# Patient Record
Sex: MALE
[De-identification: names, ages, dates, MRNs, and addresses within clinical notes are randomized per-mention and may not be internally consistent; named-entity substitution may affect disease eponyms.]

---

## 2024-08-29 ENCOUNTER — APPOINTMENT (OUTPATIENT)
Age: 29
End: 2024-08-29
Payer: COMMERCIAL

## 2024-08-29 DIAGNOSIS — M67.431 GANGLION, RIGHT WRIST: ICD-10-CM

## 2024-08-29 PROBLEM — Z00.00 ENCOUNTER FOR PREVENTIVE HEALTH EXAMINATION: Status: ACTIVE | Noted: 2024-08-29

## 2024-08-29 PROCEDURE — 20612 ASPIRATE/INJ GANGLION CYST: CPT

## 2024-08-29 PROCEDURE — 99203 OFFICE O/P NEW LOW 30 MIN: CPT | Mod: 25

## 2024-08-29 PROCEDURE — 76942 ECHO GUIDE FOR BIOPSY: CPT

## 2024-08-30 PROBLEM — M67.431 GANGLION CYST OF VOLAR ASPECT OF RIGHT WRIST: Status: ACTIVE | Noted: 2024-08-30

## 2024-08-30 NOTE — PROCEDURE
[de-identified] : Ultrasound Guided Injection   Indication: Ensure Aspiration and injection into the gnaglion cyst utilizing the [employer] portable ultrasound machine, the Linear 10mm 19-4 MHz transducer, sterile ultrasound gel, ultrasound guidance with the probe in long axis to the DRUJ , utilizing an in-plane approach, was used for the following injection:    Injection: RIGHT ganglion cyst indication: Ganglion cyst.   A discussion was had with the patient regarding this procedure and all questions were answered. All risks, benefits and alternatives were discussed. These included but were not limited to bleeding, infection, and allergic reaction. A timeout was performed prior to the procedure to ensure proper side.  Chlorhexidine was used to sterilize the skin overlying the site of the cyst.  A 25-gauge needle was used to inject 2ccs 1% Lidocaine locally. An 18 gauge needle was used to aspirate <0.5cc of fluid. A sterile bandage was then applied. The patient tolerated the procedure well and there were no complications.

## 2024-08-30 NOTE — DISCUSSION/SUMMARY
[de-identified] : 29M with no significant PMH presenting for lump to volar radial wrist x2-3mo, consistent with ganglion cyst. S/p US-guided aspiration 08/29/2024.  - Follow up PRN for recurrent symptoms. Can consider repeat aspiration +/- CSI.

## 2024-08-30 NOTE — PROCEDURE
[de-identified] : Ultrasound Guided Injection   Indication: Ensure Aspiration and injection into the gnaglion cyst utilizing the [employer] portable ultrasound machine, the Linear 10mm 19-4 MHz transducer, sterile ultrasound gel, ultrasound guidance with the probe in long axis to the DRUJ , utilizing an in-plane approach, was used for the following injection:    Injection: RIGHT ganglion cyst indication: Ganglion cyst.   A discussion was had with the patient regarding this procedure and all questions were answered. All risks, benefits and alternatives were discussed. These included but were not limited to bleeding, infection, and allergic reaction. A timeout was performed prior to the procedure to ensure proper side.  Chlorhexidine was used to sterilize the skin overlying the site of the cyst.  A 25-gauge needle was used to inject 2ccs 1% Lidocaine locally. An 18 gauge needle was used to aspirate <0.5cc of fluid. A sterile bandage was then applied. The patient tolerated the procedure well and there were no complications.

## 2024-08-30 NOTE — DISCUSSION/SUMMARY
[de-identified] : 29M with no significant PMH presenting for lump to volar radial wrist x2-3mo, consistent with ganglion cyst. S/p US-guided aspiration 08/29/2024.  - Follow up PRN for recurrent symptoms. Can consider repeat aspiration +/- CSI.

## 2024-08-30 NOTE — PHYSICAL EXAM
[de-identified] : Hand/wrist/finger (right)   Inspection Swelling: small, well-circumscribed, mobile lump to volar radial wrist without erythema Ecchymosis: none Scissoring: none   Palpation Tenderness: mild Location: volar mass   Wrist Flexion Normal. Wrist Extension Normal. Wrist Radial Deviation Normal. Wrist Ulnar Deviation Normal.   Wrist/Pinch/ Motor Strength Wrist Extension: 5/5 Wrist Flexion: 5/5 : 5/5   Finger Flexion Normal. Finger Extension Normal. Sensory index Normal

## 2024-08-30 NOTE — HISTORY OF PRESENT ILLNESS
[de-identified] : 29M with no significant PMH presenting for lump to volar radial wrist x2-3mo. Became focally painful a couple weeks ago, worse with thumb movements and weightlifting. No prior lumps or trauma. No redness, numbness, tingling, weakness. Seen by dermatologist, who suspected calcification.  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

## 2024-08-30 NOTE — HISTORY OF PRESENT ILLNESS
[de-identified] : 29M with no significant PMH presenting for lump to volar radial wrist x2-3mo. Became focally painful a couple weeks ago, worse with thumb movements and weightlifting. No prior lumps or trauma. No redness, numbness, tingling, weakness. Seen by dermatologist, who suspected calcification.  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

## 2024-08-30 NOTE — PHYSICAL EXAM
[de-identified] : Hand/wrist/finger (right)   Inspection Swelling: small, well-circumscribed, mobile lump to volar radial wrist without erythema Ecchymosis: none Scissoring: none   Palpation Tenderness: mild Location: volar mass   Wrist Flexion Normal. Wrist Extension Normal. Wrist Radial Deviation Normal. Wrist Ulnar Deviation Normal.   Wrist/Pinch/ Motor Strength Wrist Extension: 5/5 Wrist Flexion: 5/5 : 5/5   Finger Flexion Normal. Finger Extension Normal. Sensory index Normal